# Patient Record
Sex: MALE | Race: WHITE | NOT HISPANIC OR LATINO | URBAN - METROPOLITAN AREA
[De-identification: names, ages, dates, MRNs, and addresses within clinical notes are randomized per-mention and may not be internally consistent; named-entity substitution may affect disease eponyms.]

---

## 2023-04-22 ENCOUNTER — EMERGENCY (EMERGENCY)
Facility: HOSPITAL | Age: 23
LOS: 1 days | Discharge: HOME | End: 2023-04-22
Attending: EMERGENCY MEDICINE | Admitting: EMERGENCY MEDICINE
Payer: COMMERCIAL

## 2023-04-22 VITALS
HEART RATE: 79 BPM | DIASTOLIC BLOOD PRESSURE: 70 MMHG | RESPIRATION RATE: 15 BRPM | TEMPERATURE: 98 F | SYSTOLIC BLOOD PRESSURE: 139 MMHG | HEIGHT: 74 IN | OXYGEN SATURATION: 98 % | WEIGHT: 169.98 LBS
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
## 2023-04-22 DIAGNOSIS — S05.02XA INJURY OF CONJUNCTIVA AND CORNEAL ABRASION WITHOUT FOREIGN BODY, LEFT EYE, INITIAL ENCOUNTER: ICD-10-CM

## 2023-04-22 DIAGNOSIS — H57.12 OCULAR PAIN, LEFT EYE: ICD-10-CM

## 2023-04-22 PROCEDURE — 99053 MED SERV 10PM-8AM 24 HR FAC: CPT

## 2023-04-22 PROCEDURE — 99284 EMERGENCY DEPT VISIT MOD MDM: CPT

## 2023-04-23 VITALS
TEMPERATURE: 98 F | HEART RATE: 64 BPM | DIASTOLIC BLOOD PRESSURE: 68 MMHG | SYSTOLIC BLOOD PRESSURE: 115 MMHG | OXYGEN SATURATION: 96 % | RESPIRATION RATE: 16 BRPM

## 2023-04-23 RX ORDER — ERYTHROMYCIN BASE 5 MG/GRAM
1 OINTMENT (GRAM) OPHTHALMIC (EYE) ONCE
Refills: 0 | Status: COMPLETED | OUTPATIENT
Start: 2023-04-23 | End: 2023-04-23

## 2023-04-23 RX ORDER — OFLOXACIN 0.3 %
1 DROPS OPHTHALMIC (EYE) ONCE
Refills: 0 | Status: COMPLETED | OUTPATIENT
Start: 2023-04-23 | End: 2023-04-23

## 2023-04-23 RX ADMIN — Medication 1 DROP(S): at 01:18

## 2023-04-23 RX ADMIN — Medication 1 APPLICATION(S): at 01:18

## 2023-04-23 NOTE — ED ADULT NURSE NOTE - OBJECTIVE STATEMENT
pt c/o foreign body, possibly dust particle, in L eye. irritation started around 4pm. eye is visibly red and pt states vision is blurry.

## 2023-04-23 NOTE — ED PROVIDER NOTE - PHYSICAL EXAMINATION
CONSTITUTIONAL: Well appearing, well nourished, awake, alert, oriented to person, place, time/situation and in no apparent distress.  ENT: Airway patent, Nasal mucosa clear. Mouth with normal mucosa.  EYES: R eye PERRL and clear conjunctiva, L eye + scleral injection with uptake of fluorescein at 12 o'clock w/out FB, negative sidell's sign   RESPIRATORY: Breathing comfortably with normal RR.  MSK: Range of motion is not limited, no deformities noted.  NEURO: Alert and oriented, no focal deficits.  SKIN: Skin normal color for race, warm, dry and intact. No evidence of rash.  PSYCH: Alert and oriented to person, place, time/situation. normal mood and affect. no apparent risk to self or others.

## 2023-04-23 NOTE — ED PROVIDER NOTE - PATIENT PORTAL LINK FT
You can access the FollowMyHealth Patient Portal offered by [facility] by registering at the following website: [email or web address]. By joining [employer]’s FollowMyHealth portal, you will also be able to view your health information using other applications (apps) compatible with our system.

## 2023-04-23 NOTE — ED PROVIDER NOTE - NSFOLLOWUPINSTRUCTIONS_ED_ALL_ED_FT
Corneal Abrasion    A corneal abrasion is a scratch or injury to the clear covering over the front of the eye (cornea). Your cornea forms a clear dome that protects your eye and helps to focus your vision. Your cornea is made up of many layers, but the surface layer is one of the most sensitive tissues in your body. A corneal abrasion can be very painful.    If a corneal abrasion is not treated, it can become infected and cause an ulcer. This can lead to scarring. A scarred cornea can affect your vision. Sometimes abrasions come back in the same area, even after the original injury has healed.    What are the causes?  This condition may be caused by:  A poke in the eye.  A gritty or irritating substance (foreign body) in the eye.  Excessive eye rubbing.  Very dry eyes.  Certain eye infections.  Contact lenses that fit poorly or are worn for a long period of time. You can also injure your cornea when putting contact lenses in your eye or taking them out.  Eye surgery.  Certain cornea problems may increase the chance of a corneal abrasion.  Sometimes, the cause is not known.    What are the signs or symptoms?  Symptoms of this condition include:  Eye pain. The pain may get worse when you open and close your eye or when you move your eye.  A feeling of something stuck in your eye.  Tearing, redness, and sensitivity to light.  Having trouble keeping your eye open, or not being able to keep it open.  Blurred vision.  Headache.  How is this diagnosed?  You may work with a health care provider who specializes in diseases and conditions of the eye (ophthalmologist). This condition may be diagnosed based on your medical history, symptoms, and an eye exam.    Before the eye exam, numbing drops may be put into your eye. You may also have dye put in your eye with a dropper or a small paper strip. The dye makes the abrasion easy to see when your ophthalmologist examines your eye with a light. Your ophthalmologist may look at your eye through an eye scope (slit lamp).    How is this treated?  Treatment may vary depending on the cause of your condition, and it may include:  Washing out your eye.  Removing any foreign bodies that are in your eye.  Using antibiotic drops or ointment to treat or prevent an infection.  Using a dilating drop to decrease inflammation and pain.  Using steroid drops or ointment to treat redness, irritation, or inflammation.  Applying a cold, wet cloth (cold compress) or ice pack to ease the pain.  Taking pain medicine by mouth (orally).  In some cases, an eye patch or bandage soft contact lens might also be used. An eye patch should not be used if the corneal abrasion was related to contact lens wear as it can increase the chance of infection in these eyes.    Follow these instructions at home:  Medicines    Use eye drops or ointments as told by your health care provider.  If you were prescribed antibiotic drops or ointment, use them as told by your health care provider. Do not stop using the antibiotic even if you start to feel better.  Take over-the-counter and prescription medicines only as told by your health care provider.  Ask your health care provider if the medicine prescribed to you:  Requires you to avoid driving or using heavy machinery.  Can cause constipation. You may need to take these actions to prevent or treat constipation:  Drink enough fluid to keep your urine pale yellow.  Take over-the-counter or prescription medicines.  Eat foods that are high in fiber, such as beans, whole grains, and fresh fruits and vegetables.  Limit foods that are high in fat and processed sugars, such as fried or sweet foods.  Eye patch use    If you have an eye patch, wear it as told by your health care provider.  Do not drive or use machinery while wearing an eye patch. Your ability to  distances will be impaired.  Follow instructions from your health care provider about when to remove the patch.  General instructions    Ask your health care provider whether you can use a cold compress on your eye to relieve pain.  Do not rub or touch your eye. Do not wash out your eye.  Do not wear contact lenses until your health care provider says that this is okay.  Avoid bright light and eye strain.  Keep all follow-up visits as told by your health care provider. This is important for preventing infection and vision loss.  Contact a health care provider if:  You continue to have eye pain and other symptoms for more than 2 days.  You have new symptoms, such as worse redness, tearing, or discharge.  You have discharge that makes your eyelids stick together in the morning.  Your eye patch becomes so loose that you can blink your eye.  Symptoms return after the original abrasion has healed.  Get help right away if:  You have severe eye pain that does not get better with medicine.  You have vision loss.  Summary  A corneal abrasion is a scratch or injury to the clear covering over the front of the eye (cornea).  It is important to get treatment for a corneal abrasion. If this problem is not treated, it can affect your vision.  Use eye drops or ointments as told by your health care provider.  If you have an eye patch, do not drive or use machinery while wearing it. Your ability to  distances will be impaired.  Let your health care provider know if your symptoms continue for more than 2 days.

## 2023-04-23 NOTE — ED PROVIDER NOTE - OBJECTIVE STATEMENT
24 y/o M w/no sig pmhx, got dust in his L eye today while walking on the street and now has eye pain and foreign body sensation. No visual complaints. Non-contact lens wearer.

## 2023-04-23 NOTE — ED PROVIDER NOTE - CLINICAL SUMMARY MEDICAL DECISION MAKING FREE TEXT BOX
+ L corneal abrasion w/out e/o anterior uveitis or globe rupture, d/c home with erythomycin ointment at night, oflox drops for day. f/u with ophthalmologist. given return precautions.
